# Patient Record
Sex: MALE | Race: WHITE | URBAN - METROPOLITAN AREA
[De-identification: names, ages, dates, MRNs, and addresses within clinical notes are randomized per-mention and may not be internally consistent; named-entity substitution may affect disease eponyms.]

---

## 2017-06-06 ENCOUNTER — TRANSFERRED RECORDS (OUTPATIENT)
Dept: HEALTH INFORMATION MANAGEMENT | Facility: CLINIC | Age: 67
End: 2017-06-06

## 2017-06-13 ENCOUNTER — OFFICE VISIT (OUTPATIENT)
Dept: SURGERY | Facility: CLINIC | Age: 67
End: 2017-06-13
Payer: COMMERCIAL

## 2017-06-13 VITALS
HEART RATE: 57 BPM | HEIGHT: 72 IN | BODY MASS INDEX: 28.44 KG/M2 | SYSTOLIC BLOOD PRESSURE: 132 MMHG | WEIGHT: 210 LBS | DIASTOLIC BLOOD PRESSURE: 77 MMHG

## 2017-06-13 DIAGNOSIS — E21.5 PARATHYROID ABNORMALITY (H): Primary | ICD-10-CM

## 2017-06-13 DIAGNOSIS — E21.3 HYPERPARATHYROIDISM (H): ICD-10-CM

## 2017-06-13 PROBLEM — F32.A DEPRESSION: Status: ACTIVE | Noted: 2017-06-13

## 2017-06-13 PROBLEM — R97.20 ELEVATED PROSTATE SPECIFIC ANTIGEN (PSA): Status: ACTIVE | Noted: 2017-06-13

## 2017-06-13 PROCEDURE — 99244 OFF/OP CNSLTJ NEW/EST MOD 40: CPT | Performed by: SURGERY

## 2017-06-13 NOTE — LETTER
Surgery [facility]    June 13, 2017      Estrada Chapman MD     Duncan Chappell MRN# [id number]   YOB: 1950 Age: 67 year old      PCP:  Dirk Merida [phone]     Chief Complaint:  Hyperparathyroidism     Pt was seen in consultation from Dirk Merida.     History of Present Illness:  Duncan Chappell is a 67 year old male who presented with elevated serum calcium.  This is been as high as 12.  Patient was noted to have several elevated calcium levels and a PTH was drawn.  This was extremely high, greater than 400, confirming a diagnosis of primary hyperparathyroidism.  Patient does not have a history of osteopenia, pathologic fracture, or nephrolithiasis.  No family history of parathyroid disease.  No personal history of thyroid issues.  He's here to discuss management of his hyperparathyroidism.     PMH:  Duncan Chappell  has a past medical history of Cancer (H) and Diabetes (H).  PSH:  Duncan Chappell  has no past surgical history on file.     Home medications and allergies reviewed.     Social History:  Duncan Chappell  reports that he has never smoked. He does not have any smokeless tobacco history on file.  Family History:  Duncan Chappell family history is not on file.     ROS:  The 10 point Review of Systems is negative other than noted in the HPI.  Patient does admit to some unusual fatigue.  Has had increased headaches over the last several months.  No bone pain.     Physical Exam:  Blood pressure 132/77, pulse 57, height 6' (1.829 m), weight 210 lb (95.3 kg).  210 lbs 0 oz  Healthy appearing gentleman in no distress.  Patient has a pleasant affect and communicates well.   Pupils equal round and reactive to light.   No cervical lymphadenopathy or thyromegaly.   Lung fields clear, breathing comfortably.   Heart normal sinus rhythm.  No murmurs rubs or gallops.  Abdomen soft, nontender,  nondistended.  Skin warm, dry.  No obvious rashes or lesions.     All new lab and imaging data was reviewed, including outside labs, clinic notes, and review of the outside ultrasound report.        Assessment/plan:  Healthy 67-year-old gentleman with primary hyperparathyroidism.  His diseases is of a degree to warrant surgical intervention.  Neck ultrasound suggested a possible parathyroid adenoma in the left inferior position.  I would like to get another study and have ordered a high-resolution neck CT.  If this confirms the ultrasound, I would perform a left neck exploration, possible bilateral.  This would be done with the recurrent laryngeal nerve monitor and rapid PTH assay.  I explained the risks of surgery which include bleeding, recurrent laryngeal nerve injury, and hypoparathyroidism.  We will get the surgery scheduled following his next CT.     Jostin Chapman M.D.  [facility]  [phone]

## 2017-06-13 NOTE — MR AVS SNAPSHOT
After Visit Summary   6/13/2017    Duncan Chappell    MRN: [id number]           Patient Information     Date Of Birth          1950        Visit Information        Provider Department      6/13/2017 2:00 PM Estrada Chapman MD [facility] General Surgery      Today's Diagnoses     Parathyroid abnormality (H)    -  1      Care Instructions    Your 4 D neck ct is scheduled 6/21/17 at 9:00 am at [facility], your surgery is scheduled for 7/14/17 9:00 am at [facility]          Follow-ups after your visit        Your next 10 appointments already scheduled     Jun 21, 2017  9:00 AM CDT   CT SOFT TISSUE NECK W CONTRAST with SHCT1   [facility] CT ([facility])    [address] [place] [id number] [phone]           Please bring any scans or X-rays taken at other hospitals, if similar tests were done. Also bring a list of your medicines, including vitamins, minerals and over-the-counter drugs. It is safest to leave personal items at home.  Be sure to tell your doctor:   If you have any allergies.   If there s any chance you are pregnant.   If you are breastfeeding.   If you have any special needs.  You will have contrast for this exam. To prepare:   Do not eat or drink for 2 hours before your exam. If you need to take medicine, you may take it with small sips of water. (We may ask you to take liquid medicine as well.)   The day before your exam, drink extra fluids at least six 8-ounce glasses (unless your doctor tells you to restrict your fluids).  Patients over 70 or patients with diabetes or kidney problems:   If you haven t had a blood test (creatinine test) within the last 30 days, go to your clinic or Diagnostic Imaging Department for this test.  If you have diabetes:   If your kidney function is normal, continue taking your metformin (Avandamet, Glucophage,  Glucovance, Metaglip) on the day of your exam.   If your kidney function is abnormal, wait 48 hours before restarting this medicine.  Please wear loose clothing, such as a sweat suit or jogging clothes. Avoid snaps, zippers and other metal. We may ask you to undress and put on a hospital gown.  If you have any questions, please call the Imaging Department where you will have your exam.            Jul 14, 2017   Procedure with Estrada Chapman MD   [facility] (--)    [address]  [place] [address]   [phone]            Jul 14, 2017  9:00 AM CDT   [facility] Same Day Surgery with Estrada Chapman MD   [facility] ([facility]    [facility] ([facility])   [phone]              Future tests that were ordered for you today     Open Future Orders        Priority Expected Expires Ordered    CT Soft Tissue Neck w/o & w Contrast Routine  6/13/2018 6/13/2017            Who to contact     If you have questions or need follow up information about today's clinic visit or your schedule please contact [facility] directly at [phone].  Normal or non-critical lab and imaging results will be communicated to you by PhotoSpotLandhart, letter or phone within 4 business days after the clinic has received the results. If you do not hear from us within 7 days, please contact the clinic through QuickProNotest or phone. If you have a critical or abnormal lab result, we will notify you by phone as soon as possible.  Submit refill requests through TraNet'te or call your pharmacy and they will forward the refill request to us. Please allow 3 business days for your refill to be completed.          Additional Information About Your Visit        TraNet'te Information     TraNet'te lets you send messages to your doctor, view your test results, renew your prescriptions, schedule appointments and more.  "To sign up, go to [email or web address] . Click on \"Log in\" on the left side of the screen, which will take you to the Welcome page. Then click on \"Sign up Now\" on the right side of the page.     You will be asked to enter the access code listed below, as well as some personal information. Please follow the directions to create your username and password.     Your access code is: [email or web address]  Expires: 2017  3:55 PM     Your access code will  in 90 days. If you need help or a new code, please call your [place] clinic or [phone].        Care EveryWhere ID     This is your Care EveryWhere ID. This could be used by other organizations to access your [facility] [place] medical records  [id number]        Your Vitals Were     Pulse Height BMI (Body Mass Index)             57 6' (1.829 m) 28.48 kg/m2          Blood Pressure from Last 3 Encounters:   17 132/77    Weight from Last 3 Encounters:   17 210 lb (95.3 kg)               Primary Care Provider Office Phone # Fax #    Dirk Merida [phone]       [place] [address]        Thank you!     Thank you for choosing [facility]  for your care. Our goal is always to provide you with excellent care. Hearing back from our patients is one way we can continue to improve our services. Please take a few minutes to complete the written survey that you may receive in the mail after your visit with us. Thank you!             Your Updated Medication List - Protect others around you: Learn how to safely use, store and throw away your medicines at www.disposemymeds.org.          This list is accurate as of: 17  3:55 PM.  Always use your most recent med list.                   Brand Name Dispense Instructions for use    ESCITALOPRAM OXALATE PO          METFORMIN HCL PO          simvastatin 40 MG tablet    ZOCOR    90 tablet    Take 1 tablet by mouth At Bedtime.         "

## 2017-06-13 NOTE — PATIENT INSTRUCTIONS
Your 4 D neck ct is scheduled 6/21/17 at 9:00 am at [facility], your surgery is scheduled for 7/14/17 9:00 am at [facility]

## 2017-06-14 ENCOUNTER — HOSPITAL ENCOUNTER (OUTPATIENT)
Facility: CLINIC | Age: 67
End: 2017-06-14
Payer: COMMERCIAL

## 2017-06-14 DIAGNOSIS — E21.0 PRIMARY HYPERPARATHYROIDISM (H): Primary | ICD-10-CM

## 2017-06-15 PROBLEM — E21.3 HYPERPARATHYROIDISM (H): Status: ACTIVE | Noted: 2017-06-15

## 2017-06-15 NOTE — PROGRESS NOTES
Surgery Consultation, [facility] FRANCY Chapman MD    Duncan Chappell MRN# [id number]   YOB: 1950 Age: 67 year old     PCP:  Dirk Merida [phone]    Chief Complaint:  Hyperparathyroidism    Pt was seen in consultation from Dirk Merida.    History of Present Illness:  Duncan Chappell is a 67 year old male who presented with elevated serum calcium.  This is been as high as 12.  Patient was noted to have several elevated calcium levels and a PTH was drawn.  This was extremely high, greater than 400, confirming a diagnosis of primary hyperparathyroidism.  Patient does not have a history of osteopenia, pathologic fracture, or nephrolithiasis.  No family history of parathyroid disease.  No personal history of thyroid issues.  He's here to discuss management of his hyperparathyroidism.    PMH:  Duncan Chappell  has a past medical history of Cancer (H) and Diabetes (H).  PSH:  Duncan Chappell  has no past surgical history on file.    Home medications and allergies reviewed.    Social History:  Duncan Chappell  reports that he has never smoked. He does not have any smokeless tobacco history on file.  Family History:  Duncan Chappell family history is not on file.    ROS:  The 10 point Review of Systems is negative other than noted in the HPI.  Patient does admit to some unusual fatigue.  Has had increased headaches over the last several months.  No bone pain.    Physical Exam:  Blood pressure 132/77, pulse 57, height 6' (1.829 m), weight 210 lb (95.3 kg).  210 lbs 0 oz  Healthy appearing gentleman in no distress.  Patient has a pleasant affect and communicates well.   Pupils equal round and reactive to light.   No cervical lymphadenopathy or thyromegaly.   Lung fields clear, breathing comfortably.   Heart normal sinus rhythm.  No murmurs rubs or gallops.  Abdomen soft, nontender, nondistended.  Skin warm, dry.  No  obvious rashes or lesions.    All new lab and imaging data was reviewed, including outside labs, clinic notes, and review of the outside ultrasound report.       Assessment/plan:  Healthy 67-year-old gentleman with primary hyperparathyroidism.  His diseases is of a degree to warrant surgical intervention.  Neck ultrasound suggested a possible parathyroid adenoma in the left inferior position.  I would like to get another study and have ordered a high-resolution neck CT.  If this confirms the ultrasound, I would perform a left neck exploration, possible bilateral.  This would be done with the recurrent laryngeal nerve monitor and rapid PTH assay.  I explained the risks of surgery which include bleeding, recurrent laryngeal nerve injury, and hypoparathyroidism.  We will get the surgery scheduled following his next CT.    Jostin Chapman M.D.  [facility]  [phone]    Please route or send letter to:  Primary Care Provider (PCP) and Referring Provider

## 2017-06-21 ENCOUNTER — HOSPITAL ENCOUNTER (OUTPATIENT)
Dept: CT IMAGING | Facility: CLINIC | Age: 67
Discharge: HOME | End: 2017-06-21
Attending: SURGERY | Admitting: SURGERY
Payer: COMMERCIAL

## 2017-06-21 DIAGNOSIS — E21.5 PARATHYROID ABNORMALITY (H): ICD-10-CM

## 2017-06-21 LAB
CREAT BLD-MCNC: 1 MG/DL (ref 0.66–1.25)
GFR SERPL CREATININE-BSD FRML MDRD: 75 ML/MIN/1.7M2

## 2017-06-21 PROCEDURE — 82565 ASSAY OF CREATININE: CPT

## 2017-06-21 PROCEDURE — 25000125 ZZHC RX 250: Performed by: SURGERY

## 2017-06-21 PROCEDURE — 70492 CT SFT TSUE NCK W/O & W/DYE: CPT

## 2017-06-21 PROCEDURE — 25000128 H RX IP 250 OP 636: Performed by: SURGERY

## 2017-06-21 RX ORDER — IOPAMIDOL 755 MG/ML
75 INJECTION, SOLUTION INTRAVASCULAR ONCE
Status: COMPLETED | OUTPATIENT
Start: 2017-06-21 | End: 2017-06-21

## 2017-06-21 RX ADMIN — SODIUM CHLORIDE 80 ML: 9 INJECTION, SOLUTION INTRAVENOUS at 09:40

## 2017-06-21 RX ADMIN — IOPAMIDOL 75 ML: 755 INJECTION, SOLUTION INTRAVENOUS at 09:40

## 2017-07-14 ENCOUNTER — HOSPITAL ENCOUNTER (OUTPATIENT)
Facility: CLINIC | Age: 67
Discharge: HOME | End: 2017-07-14
Attending: SURGERY | Admitting: SURGERY
Payer: COMMERCIAL

## 2017-07-14 ENCOUNTER — OFFICE VISIT (OUTPATIENT)
Dept: SURGERY | Facility: PHYSICIAN GROUP | Age: 67
End: 2017-07-14
Payer: COMMERCIAL

## 2017-07-14 ENCOUNTER — ANESTHESIA EVENT (OUTPATIENT)
Dept: SURGERY | Facility: CLINIC | Age: 67
End: 2017-07-14
Payer: COMMERCIAL

## 2017-07-14 ENCOUNTER — SURGERY (OUTPATIENT)
Age: 67
End: 2017-07-14

## 2017-07-14 ENCOUNTER — HOSPITAL ENCOUNTER (OUTPATIENT)
Dept: NUCLEAR MEDICINE | Facility: CLINIC | Age: 67
Setting detail: NUCLEAR MEDICINE
End: 2017-07-14
Attending: SURGERY
Payer: COMMERCIAL

## 2017-07-14 ENCOUNTER — ANESTHESIA (OUTPATIENT)
Dept: SURGERY | Facility: CLINIC | Age: 67
End: 2017-07-14
Payer: COMMERCIAL

## 2017-07-14 VITALS
RESPIRATION RATE: 12 BRPM | HEIGHT: 72 IN | SYSTOLIC BLOOD PRESSURE: 135 MMHG | WEIGHT: 210 LBS | OXYGEN SATURATION: 95 % | DIASTOLIC BLOOD PRESSURE: 8 MMHG | TEMPERATURE: 97.5 F | BODY MASS INDEX: 28.44 KG/M2

## 2017-07-14 DIAGNOSIS — E21.0 PRIMARY HYPERPARATHYROIDISM (H): ICD-10-CM

## 2017-07-14 DIAGNOSIS — E21.3 HYPERPARATHYROIDISM (H): Primary | ICD-10-CM

## 2017-07-14 LAB
GLUCOSE BLDC GLUCOMTR-MCNC: 159 MG/DL (ref 70–99)
GLUCOSE BLDC GLUCOMTR-MCNC: 177 MG/DL (ref 70–99)
PTH-INTACT SERPL-MCNC: 43 PG/ML (ref 12–72)
PTH-INTACT SERPL-MCNC: 454 PG/ML (ref 12–72)
PTH-INTACT SERPL-MCNC: 58 PG/ML (ref 12–72)

## 2017-07-14 PROCEDURE — 36415 COLL VENOUS BLD VENIPUNCTURE: CPT | Performed by: SURGERY

## 2017-07-14 PROCEDURE — 82962 GLUCOSE BLOOD TEST: CPT

## 2017-07-14 PROCEDURE — 88305 TISSUE EXAM BY PATHOLOGIST: CPT | Performed by: SURGERY

## 2017-07-14 PROCEDURE — 37000008 ZZH ANESTHESIA TECHNICAL FEE, 1ST 30 MIN: Performed by: SURGERY

## 2017-07-14 PROCEDURE — 71000027 ZZH RECOVERY PHASE 2 EACH 15 MINS: Performed by: SURGERY

## 2017-07-14 PROCEDURE — 27210995 ZZH RX 272: Performed by: SURGERY

## 2017-07-14 PROCEDURE — 25000566 ZZH SEVOFLURANE, EA 15 MIN: Performed by: SURGERY

## 2017-07-14 PROCEDURE — 25000128 H RX IP 250 OP 636: Performed by: NURSE ANESTHETIST, CERTIFIED REGISTERED

## 2017-07-14 PROCEDURE — 37000009 ZZH ANESTHESIA TECHNICAL FEE, EACH ADDTL 15 MIN: Performed by: SURGERY

## 2017-07-14 PROCEDURE — 36000063 ZZH SURGERY LEVEL 4 EA 15 ADDTL MIN: Performed by: SURGERY

## 2017-07-14 PROCEDURE — 36000093 ZZH SURGERY LEVEL 4 1ST 30 MIN: Performed by: SURGERY

## 2017-07-14 PROCEDURE — 60500 EXPLORE PARATHYROID GLANDS: CPT | Mod: AS | Performed by: PHYSICIAN ASSISTANT

## 2017-07-14 PROCEDURE — 88331 PATH CONSLTJ SURG 1 BLK 1SPC: CPT | Performed by: SURGERY

## 2017-07-14 PROCEDURE — 40000170 ZZH STATISTIC PRE-PROCEDURE ASSESSMENT II: Performed by: SURGERY

## 2017-07-14 PROCEDURE — 25000125 ZZHC RX 250: Performed by: SURGERY

## 2017-07-14 PROCEDURE — 27210794 ZZH OR GENERAL SUPPLY STERILE: Performed by: SURGERY

## 2017-07-14 PROCEDURE — 71000012 ZZH RECOVERY PHASE 1 LEVEL 1 FIRST HR: Performed by: SURGERY

## 2017-07-14 PROCEDURE — 25000132 ZZH RX MED GY IP 250 OP 250 PS 637: Performed by: PHYSICIAN ASSISTANT

## 2017-07-14 PROCEDURE — 25000128 H RX IP 250 OP 636: Performed by: SURGERY

## 2017-07-14 PROCEDURE — 25000125 ZZHC RX 250: Performed by: NURSE ANESTHETIST, CERTIFIED REGISTERED

## 2017-07-14 PROCEDURE — 60500 EXPLORE PARATHYROID GLANDS: CPT | Performed by: SURGERY

## 2017-07-14 PROCEDURE — 88331 PATH CONSLTJ SURG 1 BLK 1SPC: CPT | Mod: 26 | Performed by: SURGERY

## 2017-07-14 PROCEDURE — S0020 INJECTION, BUPIVICAINE HYDRO: HCPCS | Performed by: SURGERY

## 2017-07-14 PROCEDURE — 83970 ASSAY OF PARATHORMONE: CPT | Performed by: SURGERY

## 2017-07-14 PROCEDURE — 88305 TISSUE EXAM BY PATHOLOGIST: CPT | Mod: 26 | Performed by: SURGERY

## 2017-07-14 RX ORDER — MAGNESIUM HYDROXIDE 1200 MG/15ML
LIQUID ORAL PRN
Status: DISCONTINUED | OUTPATIENT
Start: 2017-07-14 | End: 2017-07-14 | Stop reason: HOSPADM

## 2017-07-14 RX ORDER — PROPOFOL 10 MG/ML
INJECTION, EMULSION INTRAVENOUS CONTINUOUS PRN
Status: DISCONTINUED | OUTPATIENT
Start: 2017-07-14 | End: 2017-07-14

## 2017-07-14 RX ORDER — CEFAZOLIN SODIUM 1 G/3ML
1 INJECTION, POWDER, FOR SOLUTION INTRAMUSCULAR; INTRAVENOUS SEE ADMIN INSTRUCTIONS
Status: DISCONTINUED | OUTPATIENT
Start: 2017-07-14 | End: 2017-07-14 | Stop reason: HOSPADM

## 2017-07-14 RX ORDER — DEXAMETHASONE SODIUM PHOSPHATE 4 MG/ML
INJECTION, SOLUTION INTRA-ARTICULAR; INTRALESIONAL; INTRAMUSCULAR; INTRAVENOUS; SOFT TISSUE PRN
Status: DISCONTINUED | OUTPATIENT
Start: 2017-07-14 | End: 2017-07-14

## 2017-07-14 RX ORDER — BUPIVACAINE HYDROCHLORIDE 2.5 MG/ML
INJECTION, SOLUTION INFILTRATION; PERINEURAL PRN
Status: DISCONTINUED | OUTPATIENT
Start: 2017-07-14 | End: 2017-07-14 | Stop reason: HOSPADM

## 2017-07-14 RX ORDER — HYDROCODONE BITARTRATE AND ACETAMINOPHEN 5; 325 MG/1; MG/1
1-2 TABLET ORAL
Status: COMPLETED | OUTPATIENT
Start: 2017-07-14 | End: 2017-07-14

## 2017-07-14 RX ORDER — EPHEDRINE SULFATE 50 MG/ML
INJECTION, SOLUTION INTRAMUSCULAR; INTRAVENOUS; SUBCUTANEOUS PRN
Status: DISCONTINUED | OUTPATIENT
Start: 2017-07-14 | End: 2017-07-14

## 2017-07-14 RX ORDER — FENTANYL CITRATE 50 UG/ML
25-50 INJECTION, SOLUTION INTRAMUSCULAR; INTRAVENOUS
Status: DISCONTINUED | OUTPATIENT
Start: 2017-07-14 | End: 2017-07-14 | Stop reason: HOSPADM

## 2017-07-14 RX ORDER — GLYCOPYRROLATE 0.2 MG/ML
INJECTION, SOLUTION INTRAMUSCULAR; INTRAVENOUS PRN
Status: DISCONTINUED | OUTPATIENT
Start: 2017-07-14 | End: 2017-07-14

## 2017-07-14 RX ORDER — LIDOCAINE HYDROCHLORIDE 20 MG/ML
INJECTION, SOLUTION INFILTRATION; PERINEURAL PRN
Status: DISCONTINUED | OUTPATIENT
Start: 2017-07-14 | End: 2017-07-14

## 2017-07-14 RX ORDER — FENTANYL CITRATE 50 UG/ML
INJECTION, SOLUTION INTRAMUSCULAR; INTRAVENOUS PRN
Status: DISCONTINUED | OUTPATIENT
Start: 2017-07-14 | End: 2017-07-14

## 2017-07-14 RX ORDER — ONDANSETRON 2 MG/ML
4 INJECTION INTRAMUSCULAR; INTRAVENOUS EVERY 30 MIN PRN
Status: DISCONTINUED | OUTPATIENT
Start: 2017-07-14 | End: 2017-07-14 | Stop reason: HOSPADM

## 2017-07-14 RX ORDER — BUPIVACAINE HYDROCHLORIDE 2.5 MG/ML
INJECTION, SOLUTION EPIDURAL; INFILTRATION; INTRACAUDAL
Status: DISCONTINUED
Start: 2017-07-14 | End: 2017-07-14 | Stop reason: HOSPADM

## 2017-07-14 RX ORDER — HYDROCODONE BITARTRATE AND ACETAMINOPHEN 5; 325 MG/1; MG/1
1-2 TABLET ORAL EVERY 4 HOURS PRN
Qty: 20 TABLET | Refills: 0 | Status: SHIPPED | OUTPATIENT
Start: 2017-07-14 | End: 2017-07-17

## 2017-07-14 RX ORDER — ONDANSETRON 2 MG/ML
INJECTION INTRAMUSCULAR; INTRAVENOUS PRN
Status: DISCONTINUED | OUTPATIENT
Start: 2017-07-14 | End: 2017-07-14

## 2017-07-14 RX ORDER — PHYSOSTIGMINE SALICYLATE 1 MG/ML
1.2 INJECTION INTRAVENOUS
Status: DISCONTINUED | OUTPATIENT
Start: 2017-07-14 | End: 2017-07-14 | Stop reason: HOSPADM

## 2017-07-14 RX ORDER — PROPOFOL 10 MG/ML
INJECTION, EMULSION INTRAVENOUS PRN
Status: DISCONTINUED | OUTPATIENT
Start: 2017-07-14 | End: 2017-07-14

## 2017-07-14 RX ORDER — CEFAZOLIN SODIUM 2 G/100ML
2 INJECTION, SOLUTION INTRAVENOUS
Status: COMPLETED | OUTPATIENT
Start: 2017-07-14 | End: 2017-07-14

## 2017-07-14 RX ORDER — NALOXONE HYDROCHLORIDE 0.4 MG/ML
.1-.4 INJECTION, SOLUTION INTRAMUSCULAR; INTRAVENOUS; SUBCUTANEOUS
Status: DISCONTINUED | OUTPATIENT
Start: 2017-07-14 | End: 2017-07-14 | Stop reason: HOSPADM

## 2017-07-14 RX ORDER — FENTANYL CITRATE 50 UG/ML
25-50 INJECTION, SOLUTION INTRAMUSCULAR; INTRAVENOUS EVERY 5 MIN PRN
Status: DISCONTINUED | OUTPATIENT
Start: 2017-07-14 | End: 2017-07-14 | Stop reason: HOSPADM

## 2017-07-14 RX ORDER — ONDANSETRON 4 MG/1
4 TABLET, ORALLY DISINTEGRATING ORAL EVERY 30 MIN PRN
Status: DISCONTINUED | OUTPATIENT
Start: 2017-07-14 | End: 2017-07-14 | Stop reason: HOSPADM

## 2017-07-14 RX ORDER — MEPERIDINE HYDROCHLORIDE 25 MG/ML
12.5 INJECTION INTRAMUSCULAR; INTRAVENOUS; SUBCUTANEOUS
Status: DISCONTINUED | OUTPATIENT
Start: 2017-07-14 | End: 2017-07-14 | Stop reason: HOSPADM

## 2017-07-14 RX ORDER — SODIUM CHLORIDE, SODIUM LACTATE, POTASSIUM CHLORIDE, CALCIUM CHLORIDE 600; 310; 30; 20 MG/100ML; MG/100ML; MG/100ML; MG/100ML
INJECTION, SOLUTION INTRAVENOUS CONTINUOUS PRN
Status: DISCONTINUED | OUTPATIENT
Start: 2017-07-14 | End: 2017-07-14

## 2017-07-14 RX ORDER — SODIUM CHLORIDE, SODIUM LACTATE, POTASSIUM CHLORIDE, CALCIUM CHLORIDE 600; 310; 30; 20 MG/100ML; MG/100ML; MG/100ML; MG/100ML
INJECTION, SOLUTION INTRAVENOUS CONTINUOUS
Status: DISCONTINUED | OUTPATIENT
Start: 2017-07-14 | End: 2017-07-14 | Stop reason: HOSPADM

## 2017-07-14 RX ORDER — NEOSTIGMINE METHYLSULFATE 1 MG/ML
VIAL (ML) INJECTION PRN
Status: DISCONTINUED | OUTPATIENT
Start: 2017-07-14 | End: 2017-07-14

## 2017-07-14 RX ADMIN — FENTANYL CITRATE 50 MCG: 50 INJECTION, SOLUTION INTRAMUSCULAR; INTRAVENOUS at 09:52

## 2017-07-14 RX ADMIN — SODIUM CHLORIDE, POTASSIUM CHLORIDE, SODIUM LACTATE AND CALCIUM CHLORIDE: 600; 310; 30; 20 INJECTION, SOLUTION INTRAVENOUS at 09:46

## 2017-07-14 RX ADMIN — BUPIVACAINE HYDROCHLORIDE 10 ML: 2.5 INJECTION, SOLUTION EPIDURAL; INFILTRATION; INTRACAUDAL; PERINEURAL at 11:48

## 2017-07-14 RX ADMIN — Medication 5 MG: at 11:10

## 2017-07-14 RX ADMIN — HYDROCODONE BITARTRATE AND ACETAMINOPHEN 1 TABLET: 5; 325 TABLET ORAL at 12:38

## 2017-07-14 RX ADMIN — LIDOCAINE HYDROCHLORIDE 60 MG: 20 INJECTION, SOLUTION INFILTRATION; PERINEURAL at 09:52

## 2017-07-14 RX ADMIN — ONDANSETRON 4 MG: 2 INJECTION INTRAMUSCULAR; INTRAVENOUS at 11:34

## 2017-07-14 RX ADMIN — Medication 5 MG: at 10:55

## 2017-07-14 RX ADMIN — PROPOFOL 200 MCG/KG/MIN: 10 INJECTION, EMULSION INTRAVENOUS at 09:52

## 2017-07-14 RX ADMIN — Medication 27.8 MILLICURIE: at 08:35

## 2017-07-14 RX ADMIN — ROCURONIUM BROMIDE 60 MG: 10 INJECTION INTRAVENOUS at 09:52

## 2017-07-14 RX ADMIN — SODIUM CHLORIDE 1000 ML: 0.9 IRRIGANT IRRIGATION at 11:48

## 2017-07-14 RX ADMIN — Medication 5 MG: at 10:40

## 2017-07-14 RX ADMIN — SODIUM CHLORIDE, POTASSIUM CHLORIDE, SODIUM LACTATE AND CALCIUM CHLORIDE: 600; 310; 30; 20 INJECTION, SOLUTION INTRAVENOUS at 10:57

## 2017-07-14 RX ADMIN — GLYCOPYRROLATE 0.7 MG: 0.2 INJECTION, SOLUTION INTRAMUSCULAR; INTRAVENOUS at 11:55

## 2017-07-14 RX ADMIN — GLYCOPYRROLATE 0.1 MG: 0.2 INJECTION, SOLUTION INTRAMUSCULAR; INTRAVENOUS at 10:05

## 2017-07-14 RX ADMIN — Medication 10 MG: at 10:07

## 2017-07-14 RX ADMIN — NEOSTIGMINE METHYLSULFATE 4.5 MG: 1 INJECTION INTRAMUSCULAR; INTRAVENOUS; SUBCUTANEOUS at 11:55

## 2017-07-14 RX ADMIN — MIDAZOLAM HYDROCHLORIDE 2 MG: 1 INJECTION, SOLUTION INTRAMUSCULAR; INTRAVENOUS at 09:46

## 2017-07-14 RX ADMIN — DEXAMETHASONE SODIUM PHOSPHATE 4 MG: 4 INJECTION, SOLUTION INTRA-ARTICULAR; INTRALESIONAL; INTRAMUSCULAR; INTRAVENOUS; SOFT TISSUE at 10:03

## 2017-07-14 RX ADMIN — PROPOFOL 200 MG: 10 INJECTION, EMULSION INTRAVENOUS at 09:52

## 2017-07-14 RX ADMIN — GLYCOPYRROLATE 0.1 MG: 0.2 INJECTION, SOLUTION INTRAMUSCULAR; INTRAVENOUS at 10:35

## 2017-07-14 RX ADMIN — CEFAZOLIN SODIUM 2 G: 2 INJECTION, SOLUTION INTRAVENOUS at 10:00

## 2017-07-14 RX ADMIN — FENTANYL CITRATE 50 MCG: 50 INJECTION, SOLUTION INTRAMUSCULAR; INTRAVENOUS at 10:28

## 2017-07-14 RX ADMIN — Medication 5 MG: at 10:42

## 2017-07-14 NOTE — DISCHARGE INSTRUCTIONS
HOME CARE FOLLOWING PARATHYROID SURGERY      INCISIONAL CARE:    You may expect a small amount of drainage from your previous drain site.  Cover with bandage as needed.    After removing the dressing, you may shower.  Do not submerse the incision for 1 week.    Sutures will absorb and do not need to be removed.    Leave the steri-strip (white paper tape) in place until it falls off, or remove at 1 week after surgery.    A lump/ridge under the incision is normal and will gradually resolve.    ACTIVITY:  Light Activity -- you may immediately be up and about as tolerated.  Driving -- you may drive when comfortable and off narcotic pain medications.  Light Work -- resume when comfortable off pain medications.  (If you can drive, you probably can work.)  Strenuous Work/Activity -- limit lifting to 20 pounds for 1 week.  Progressively increase with time.  Active Sports (running, biking, etc.) -- resume when comfortable.    DIET:  No restrictions.  Increased fluid intake is recommended. While taking pain medications, increase dietary fiber or add a fiber supplementation like Metamucil or Citrucel to help prevent constipation - a possible side effect of pain medications.    DISCOMFORT:  Use pain medications as prescribed by your surgeon.  Take the pain medication with some food, when possible, to minimize side effects.  Intermittent use of ice packs may help during the first 48 hours.  Expect gradual improvement.    CALCIUM SUPPLEMENTATION:  Most patients are prescribed to take a calcium supplement after surgery.  Please take as prescribed.  Watch for symptoms of numbness or tingling around the mouth or in the fingers or toes.  This may be a sign of a low calcium level.  If this happens, take an extra dose of your calcium supplement.  If the symptoms persist, please contact the office.  You may need to have your blood calcium level checked by doing a simple blood test.  We may also need to make further adjustments in your  dose of calcium supplementation.  At times, an office visit is required.     RETURN APPOINTMENT:  Schedule a follow-up visit 2 weeks post-op (you may do this any time after surgery is scheduled).  Office Phone:  [phone]     CONTACT US IF THE FOLLOWING DEVELOPS:   1. A fever that is above 101     2. If there is a large amount of drainage, bleeding, or swelling.   3. Severe pain that is not relieved by your prescription.   4. Drainage that is thick, cloudy, yellow, green or white.               Same Day Surgery Discharge Instructions for  Sedation and General Anesthesia       It's not unusual to feel dizzy, light-headed or faint for up to 24 hours after surgery or while taking pain medication.  If you have these symptoms: sit for a few minutes before standing and have someone assist you when you get up to walk or use the bathroom.      You should rest and relax for the next 24 hours. We recommend you make arrangements to have an adult stay with you for at least 24 hours after your discharge.  Avoid hazardous and strenuous activity.      DO NOT DRIVE any vehicle or operate mechanical equipment for 24 hours following the end of your surgery.  Even though you may feel normal, your reactions may be affected by the medication you have received.      Do not drink alcoholic beverages for 24 hours following surgery.       Slowly progress to your regular diet as you feel able. It's not unusual to feel nauseated and/or vomit after receiving anesthesia.  If you develop these symptoms, drink clear liquids (apple juice, ginger ale, broth, 7-up, etc. ) until you feel better.  If your nausea and vomiting persists for 24 hours, please notify your surgeon.        All narcotic pain medications, along with inactivity and anesthesia, can cause constipation. Drinking plenty of liquids and increasing fiber intake will help.      For any questions of a medical nature, call your surgeon.      Do not make important decisions for 24  hours.      If you had general anesthesia, you may have a sore throat for a couple of days related to the breathing tube used during surgery.  You may use Cepacol lozenges to help with this discomfort.  If it worsens or if you develop a fever, contact your surgeon.       If you feel your pain is not well managed with the pain medications prescribed by your surgeon, please contact your surgeon's office to let them know so they can address your concerns.        **If you have concerns or questions about your procedure,    please contact Dr Chapman at  [phone]**

## 2017-07-14 NOTE — ANESTHESIA POSTPROCEDURE EVALUATION
Patient: Duncan Chappell    Procedure(s):  RIGHT NECK EXPLORATION;RIGHT EXCISION PARATHYROID ADENOMA WITH FROZEN SECTION - Wound Class: I-Clean   - Wound Class: I-Clean    Diagnosis:PRIMARY HYPER PARATHYROIDISM  Diagnosis Additional Information: No value filed.    Anesthesia Type:  General    Note:  Anesthesia Post Evaluation    Patient location during evaluation: PACU  Patient participation: Able to fully participate in evaluation  Level of consciousness: awake  Pain management: adequate  Airway patency: patent  Cardiovascular status: acceptable  Respiratory status: acceptable  Hydration status: acceptable  PONV: controlled     Anesthetic complications: None          Last vitals:  Vitals:    07/14/17 1205 07/14/17 1215 07/14/17 1230   BP: 163/85 143/77 (!) 117/95   Resp: 12 11 16   Temp: 36.5  C (97.7  F) 36.3  C (97.3  F) 36.4  C (97.5  F)   SpO2: 99% 100% 94%         Electronically Signed By: Baldo Hurd MD  July 14, 2017  12:37 PM

## 2017-07-14 NOTE — BRIEF OP NOTE
[facility] Brief Operative Note    Pre-operative diagnosis: PRIMARY HYPER PARATHYROIDISM   Post-operative diagnosis Hyperparathyroidism      Procedure: Procedure(s):  RIGHT NECK EXPLORATION;RIGHT EXCISION PARATHYROID ADENOMA WITH FROZEN SECTION - Wound Class: I-Clean   - Wound Class: I-Clean   Surgeon(s): Surgeon(s) and Role:     * Estrada Chapman MD - Primary     * Kalpana Diamond PA-C - Assisting   Estimated blood loss: 5 mL    Specimens:   ID Type Source Tests Collected by Time Destination   1 : Blood Blood, venous Blood PARATHYROID HORMONE INTACT INTRAOPERATIVE Estrada Chapman MD 7/14/2017 11:13 AM    2 : Blood Blood, venous Blood PARATHYROID HORMONE INTACT INTRAOPERATIVE Estrada Chapman MD 7/14/2017 11:21 AM    A : Right superior parathyroid gland Tissue Parathyroid SURGICAL PATHOLOGY EXAM Estrada Chapman MD 7/14/2017 10:58 AM       Findings: Same as above.       Kalpana Diamond PA-C

## 2017-07-14 NOTE — OP NOTE
General Surgery Operative Note    PREOPERATIVE DIAGNOSIS:  Primary hyperparathyroidism.    POSTOPERATIVE DIAGNOSIS:  Primary hyperparathyroidism.    PROCEDURE:   Excision of right superior parathyroid adenoma, Unilateral neck exploration.    With recurrent laryngeal nerve monitor.    ANESTHESIA:  General.    PREOPERATIVE MEDICATIONS:  Ancef IV.    SURGEON:  Estrada Chapman MD, MD    ASSISTANT:  Kalpana Diamond PA-C.  First assistant was necessary due to challenging exposure and the need for improved visualization and help maintaining hemostasis.    ESTIMATED BLOOD LOSS:  10 cc's    INDICATIONS:  Duncan Chappell is a 67 year old male who has primary hyperparathyroidism. He has had symptoms of hypercalcemia.  He has been found to have an elevated calcium of 12.  He has a localizing sestamibi in the right middle neck.  He presents today for neck exploration, excision of parathyroid adenoma.  His PTH preoperatively is 450.    DESCRIPTION OF PROCEDURE:  The patient was placed supine, head and neck in extension and a bump between the scapulae.  Transverse cervical neck creases had been marked in the preinduction area and the one most suitable was utilized for exposure.  The gamma probe was utilized to find the area of increased counts which correlated with the preoperative localizing sestimibi.  Incision was made, superior and inferior skin flaps raised.  Midline fascia opened and reflected to the right.  An abnormal parathyroid was identified at the right superior location.  It was meticulously dissected from the surrounding tissue and submitted for frozen section which confirmed a .85 gram hypercellular parathyroid.  We then explored the right inferior neck.  During the exploration, I encountered what I believe to be the second parathyroid. It was not biopsied but appeared to represent normal parathyroid tissue. The recurrent laryngeal nerve was also identified and conduction was normal with nerve  monitor.  The site was irrigated and inspected for hemostasis.  The PTH assays were sent at 15 and 25 minutes post-excision and the first value came back at 55, signifying the completeness of the dissection.  The patient's incision site was then closed with running 3-0 Vicryl for the midline fascia, interrupted for platysma and 4-0 subcuticular Monocryl for skin.  Marcaine 0.25% plain was instilled and the patient transferred to recovery in good condition.    INTRAOPERATIVE FINDINGS:  1.  A 0.85 gram hypercellular right inferior parathyroid correlated nicely with sestamibi scan.  2.  Second right-sided parathyroid appeared to be grossly normal.  3.  PTH assay diminished from 450 to 55 at 15 minutes post excision.  4.  Grossly normal thyroid.    Specimens:   ID Type Source Tests Collected by Time Destination   1 : Blood Blood, venous Blood PARATHYROID HORMONE INTACT INTRAOPERATIVE Estrada Chapman MD 7/14/2017 11:13 AM    2 : Blood Blood, venous Blood PARATHYROID HORMONE INTACT INTRAOPERATIVE Estrada Chapman MD 7/14/2017 11:21 AM    A : Right superior parathyroid gland Tissue Parathyroid SURGICAL PATHOLOGY EXAM Estrada Chapman MD 7/14/2017 10:58 AM        Estrada Chapman MD, MD

## 2017-07-14 NOTE — OR NURSING
Per Dr. Chapman, pt does not need to take calcium supplementation.  Pt and wife notified at discharge.

## 2017-07-14 NOTE — ANESTHESIA CARE TRANSFER NOTE
Patient: Duncan Chappell    Procedure(s):  RIGHT NECK EXPLORATION;RIGHT EXCISION PARATHYROID ADENOMA WITH FROZEN SECTION - Wound Class: I-Clean   - Wound Class: I-Clean    Diagnosis: PRIMARY HYPER PARATHYROIDISM  Diagnosis Additional Information: No value filed.    Anesthesia Type:   General     Note:  Airway :Face Mask  Patient transferred to:PACU  Comments: Pt awake and able to verbalize needs. ALL monitors on and audible. Vital signs stable. Spontaneous respiration without difficulty. o2 sats 100% on 10Lo2 per face mask. Pt denies pain. Report given to PACU RN.      Vitals: (Last set prior to Anesthesia Care Transfer)    CRNA VITALS  7/14/2017 1133 - 7/14/2017 1208      7/14/2017             NIBP: 163/85    Pulse: 65    SpO2: 100 %                Electronically Signed By: MICHELLE Esquivel CRNA  July 14, 2017  12:08 PM

## 2017-07-14 NOTE — IP AVS SNAPSHOT
MRN:[id number]                      After Visit Summary   7/14/2017    Duncan Chappell    MRN: [id number]           Thank you!     Thank you for choosing [facility] for your care. Our goal is always to provide you with excellent care. Hearing back from our patients is one way we can continue to improve our services. Please take a few minutes to complete the written survey that you may receive in the mail after you visit with us. Thank you!        Patient Information     Date Of Birth          1950        About your hospital stay     You were admitted on:  July 14, 2017 You last received care in the:  [facility]    You were discharged on:  July 14, 2017       Who to Call     For medical emergencies, please call 911.  For non-urgent questions about your medical care, please call your primary care provider or clinic, [phone]  For questions related to your surgery, please call your surgery clinic        Attending Provider     Provider Estrada Mak MD Surgery       Primary Care Provider Office Phone # Fax #    Dirk Merida [phone]      After Care Instructions     Discharge Instructions       Patient to follow up with Dr Chapman in 2 weeks, call office for appointment at [phone]                  Further instructions from your care team         HOME CARE FOLLOWING PARATHYROID SURGERY      INCISIONAL CARE:    You may expect a small amount of drainage from your previous drain site.  Cover with bandage as needed.    After removing the dressing, you may shower.  Do not submerse the incision for 1 week.    Sutures will absorb and do not need to be removed.    Leave the steri-strip (white paper tape) in place until it falls off, or remove at 1 week after surgery.    A lump/ridge under the incision is normal and will gradually resolve.    ACTIVITY:  Light Activity -- you may immediately be up and about as  tolerated.  Driving -- you may drive when comfortable and off narcotic pain medications.  Light Work -- resume when comfortable off pain medications.  (If you can drive, you probably can work.)  Strenuous Work/Activity -- limit lifting to 20 pounds for 1 week.  Progressively increase with time.  Active Sports (running, biking, etc.) -- resume when comfortable.    DIET:  No restrictions.  Increased fluid intake is recommended. While taking pain medications, increase dietary fiber or add a fiber supplementation like Metamucil or Citrucel to help prevent constipation - a possible side effect of pain medications.    DISCOMFORT:  Use pain medications as prescribed by your surgeon.  Take the pain medication with some food, when possible, to minimize side effects.  Intermittent use of ice packs may help during the first 48 hours.  Expect gradual improvement.    CALCIUM SUPPLEMENTATION:  Most patients are prescribed to take a calcium supplement after surgery.  Please take as prescribed.  Watch for symptoms of numbness or tingling around the mouth or in the fingers or toes.  This may be a sign of a low calcium level.  If this happens, take an extra dose of your calcium supplement.  If the symptoms persist, please contact the office.  You may need to have your blood calcium level checked by doing a simple blood test.  We may also need to make further adjustments in your dose of calcium supplementation.  At times, an office visit is required.     RETURN APPOINTMENT:  Schedule a follow-up visit 2 weeks post-op (you may do this any time after surgery is scheduled).  Office Phone:  [phone]     CONTACT US IF THE FOLLOWING DEVELOPS:   1. A fever that is above 101     2. If there is a large amount of drainage, bleeding, or swelling.   3. Severe pain that is not relieved by your prescription.   4. Drainage that is thick, cloudy, yellow, green or white.               Same Day Surgery Discharge Instructions for  Sedation and  General Anesthesia       It's not unusual to feel dizzy, light-headed or faint for up to 24 hours after surgery or while taking pain medication.  If you have these symptoms: sit for a few minutes before standing and have someone assist you when you get up to walk or use the bathroom.      You should rest and relax for the next 24 hours. We recommend you make arrangements to have an adult stay with you for at least 24 hours after your discharge.  Avoid hazardous and strenuous activity.      DO NOT DRIVE any vehicle or operate mechanical equipment for 24 hours following the end of your surgery.  Even though you may feel normal, your reactions may be affected by the medication you have received.      Do not drink alcoholic beverages for 24 hours following surgery.       Slowly progress to your regular diet as you feel able. It's not unusual to feel nauseated and/or vomit after receiving anesthesia.  If you develop these symptoms, drink clear liquids (apple juice, ginger ale, broth, 7-up, etc. ) until you feel better.  If your nausea and vomiting persists for 24 hours, please notify your surgeon.        All narcotic pain medications, along with inactivity and anesthesia, can cause constipation. Drinking plenty of liquids and increasing fiber intake will help.      For any questions of a medical nature, call your surgeon.      Do not make important decisions for 24 hours.      If you had general anesthesia, you may have a sore throat for a couple of days related to the breathing tube used during surgery.  You may use Cepacol lozenges to help with this discomfort.  If it worsens or if you develop a fever, contact your surgeon.       If you feel your pain is not well managed with the pain medications prescribed by your surgeon, please contact your surgeon's office to let them know so they can address your concerns.        **If you have concerns or questions about your procedure,    please contact Dr Chapman at   "[phone]**        Pending Results     Date and Time Order Name Status Description    2017 1059 Surgical pathology exam In process     2017 0741 NM Parathyroid Surgical Injection In process             Admission Information     Date & Time Provider Department Dept. Phone    2017 Estrada Chapman MD [facility] Same Day Surgery [phone]      Your Vitals Were     Blood Pressure Temperature Respirations Height Weight Pulse Oximetry    117/95 97.5  F (36.4  C) 16 1.829 m (6') 95.3 kg (210 lb) 94%    BMI (Body Mass Index)                   28.48 kg/m2           MyChart Information     [employer] lets you send messages to your doctor, view your test results, renew your prescriptions, schedule appointments and more. To sign up, go to [email or web address] . Click on \"Log in\" on the left side of the screen, which will take you to the Welcome page. Then click on \"Sign up Now\" on the right side of the page.     You will be asked to enter the access code listed below, as well as some personal information. Please follow the directions to create your username and password.     Your access code is: [email or web address]  Expires: 2017  3:55 PM     Your access code will  in 90 days. If you need help or a new code, please call your [facility] clinic or [phone].        Care EveryWhere ID     This is your Care EveryWhere ID. This could be used by other organizations to access your [facility] medical records  [id number]        Equal Access to Services     [facility] THOMAS : Hadii selma bergman hadasho Somichaelali, waaxda luqadaha, qaybta kaalmada adeegyada, dawna idiin hayaan adeirma anne . So [facility] [phone].    ATENCIÓN: Si habla español, tiene a levy disposición servicios gratuitos de asistencia lingüística. Llame al [phone].    We comply with applicable federal civil rights laws and [place] laws. We do not discriminate on the basis of race, color, national origin, age, disability sex, " sexual orientation or gender identity.               Review of your medicines      START taking        Dose / Directions    HYDROcodone-acetaminophen 5-325 MG per tablet   Commonly known as:  NORCO   Used for:  Hyperparathyroidism (H)   Notes to Patient:  You were given one tablet at 12:40 p.m.        Dose:  1-2 tablet   Take 1-2 tablets by mouth every 4 hours as needed for other (Moderate to Severe Pain)   Quantity:  20 tablet   Refills:  0         CONTINUE these medicines which have NOT CHANGED        Dose / Directions    ESCITALOPRAM OXALATE PO        Refills:  0       METFORMIN HCL PO        Dose:  1000 mg   Take 1,000 mg by mouth 2 times daily (with meals)   Refills:  0       simvastatin 40 MG tablet   Commonly known as:  ZOCOR   Used for:  Hypercholesterolemia        Dose:  40 mg   Take 1 tablet by mouth At Bedtime.   Quantity:  90 tablet   Refills:  3            Where to get your medicines      Some of these will need a paper prescription and others can be bought over the counter. Ask your nurse if you have questions.     Bring a paper prescription for each of these medications     HYDROcodone-acetaminophen 5-325 MG per tablet                Protect others around you: Learn how to safely use, store and throw away your medicines at www.disposemymeds.org.             Medication List: This is a list of all your medications and when to take them. Check marks below indicate your daily home schedule. Keep this list as a reference.      Medications           Morning Afternoon Evening Bedtime As Needed    ESCITALOPRAM OXALATE PO                                HYDROcodone-acetaminophen 5-325 MG per tablet   Commonly known as:  NORCO   Take 1-2 tablets by mouth every 4 hours as needed for other (Moderate to Severe Pain)   Last time this was given:  1 tablet on 7/14/2017 12:38 PM   Notes to Patient:  You were given one tablet at 12:40 p.m.                                METFORMIN HCL PO   Take 1,000 mg by mouth 2 times  daily (with meals)                                simvastatin 40 MG tablet   Commonly known as:  ZOCOR   Take 1 tablet by mouth At Bedtime.

## 2017-07-14 NOTE — ANESTHESIA PREPROCEDURE EVALUATION
Anesthesia Evaluation     . Pt has had prior anesthetic. Type: General           ROS/MED HX    ENT/Pulmonary:       Neurologic:       Cardiovascular:         METS/Exercise Tolerance:     Hematologic:         Musculoskeletal:         GI/Hepatic:         Renal/Genitourinary:         Endo:     (+) type II DM thyroid problem  Thyroid disease - Other hyperparathyroidism, .      Psychiatric:     (+) psychiatric history depression      Infectious Disease:         Malignancy:   (+) Malignancy History of Prostate          Other:                                    Anesthesia Plan      History & Physical Review  History and physical reviewed and following examination; no interval change.    ASA Status:  2 .        Plan for General with Intravenous induction. Maintenance will be TIVA.    PONV prophylaxis:  Ondansetron (or other 5HT-3) and Dexamethasone or Solumedrol  Propofol, Zofran, and decadron      Postoperative Care  Postoperative pain management:  IV analgesics and Oral pain medications.      Consents  Anesthetic plan, risks, benefits and alternatives discussed with:  Patient..                          .

## 2017-07-14 NOTE — IP AVS SNAPSHOT
[facility]    [address]    [place] [address]    Phone:  [phone]    Fax:  [phone]                                       After Visit Summary   7/14/2017    Duncan Chappell    MRN: [id number]           After Visit Summary Signature Page     I have received my discharge instructions, and my questions have been answered. I have discussed any challenges I see with this plan with the nurse or doctor.    ..........................................................................................................................................  Patient/Patient Representative Signature      ..........................................................................................................................................  Patient Representative Print Name and Relationship to Patient    ..................................................               ................................................  Date                                            Time    ..........................................................................................................................................  Reviewed by Signature/Title    ...................................................              ..............................................  Date                                                            Time

## 2017-07-17 ENCOUNTER — TELEPHONE (OUTPATIENT)
Dept: SURGERY | Facility: CLINIC | Age: 67
End: 2017-07-17

## 2017-07-17 LAB — COPATH REPORT: NORMAL

## 2017-07-17 NOTE — TELEPHONE ENCOUNTER
"Post Surgical Follow Up Call -     \"Hi, my name is Tisha Anderson, I'm a registered nurse, and I am calling from [facility] to follow up and see how things are going for you after your recent surgery.\"    Tell me how you are doing now that you are home?\" Patient states that he is doing well since surgery, no concerns at this time      Discharge Instructions    \"Do you have any questions regarding your discharge instructions?\"  No    If applicable:  \"Are you currently taking your post op medication?\"  No     If yes, review dosing schedule with patient.  NA    If applicable:  Discuss any pathology results within normal limits.  NA    Check EPIC schedule to see if patient has Post Op visit already scheduled. Patient was scheduled for post op appt. On 7/27/17 at 1:00pm       Call Summary      \"If you have questions, we encourage you contact us through the main clinic number (give number).\"      \"Thank you for your time and take care!\"    "

## 2017-07-27 ENCOUNTER — OFFICE VISIT (OUTPATIENT)
Dept: SURGERY | Facility: CLINIC | Age: 67
End: 2017-07-27
Payer: COMMERCIAL

## 2017-07-27 ENCOUNTER — HOSPITAL ENCOUNTER (OUTPATIENT)
Dept: LAB | Facility: CLINIC | Age: 67
Discharge: HOME | End: 2017-07-27
Attending: SURGERY | Admitting: SURGERY
Payer: COMMERCIAL

## 2017-07-27 DIAGNOSIS — Z09 SURGERY FOLLOW-UP EXAMINATION: ICD-10-CM

## 2017-07-27 DIAGNOSIS — Z09 SURGERY FOLLOW-UP EXAMINATION: Primary | ICD-10-CM

## 2017-07-27 LAB — CALCIUM SERPL-MCNC: 9 MG/DL (ref 8.5–10.1)

## 2017-07-27 PROCEDURE — 36415 COLL VENOUS BLD VENIPUNCTURE: CPT | Performed by: SURGERY

## 2017-07-27 PROCEDURE — 82310 ASSAY OF CALCIUM: CPT | Performed by: SURGERY

## 2017-07-27 PROCEDURE — 82542 COL CHROMOTOGRAPHY QUAL/QUAN: CPT | Mod: 90 | Performed by: SURGERY

## 2017-07-27 PROCEDURE — 99000 SPECIMEN HANDLING OFFICE-LAB: CPT | Performed by: SURGERY

## 2017-07-27 PROCEDURE — 99024 POSTOP FOLLOW-UP VISIT: CPT | Performed by: SURGERY

## 2017-07-27 NOTE — PROGRESS NOTES
Surgery Postop Note    Duncan Chappell presents today for surgical followup.  he is doing well following neck exploration parathyroid adenoma excision.  Incisions look fine with no signs of wound infection.  We removed a benign 850 mg parathyroid gland.  PTH dropped from greater than 400 preoperatively to 48 after removal of the gland.  He feels well.  Voice is normal.  I expect him to make a complete recovery.  Thank you for the opportunity to help in his care.    Jostin Chapman M.D.  [facility]  [phone]    Please route or send letter to:  Primary Care Provider (PCP) and Referring Provider

## 2017-07-27 NOTE — MR AVS SNAPSHOT
"              After Visit Summary   7/27/2017    Duncan Chappell    MRN: [id number]           Patient Information     Date Of Birth          1950        Visit Information        Provider Department      7/27/2017 1:00 PM Estrada Chapman MD [facility]      Today's Diagnoses     Surgery follow-up examination    -  1       Follow-ups after your visit        Future tests that were ordered for you today     Open Future Orders        Priority Expected Expires Ordered    Calcium Routine 7/27/2017 7/27/2018 7/27/2017            Who to contact     If you have questions or need follow up information about today's clinic visit or your schedule please contact [facility] directly at [phone].  Normal or non-critical lab and imaging results will be communicated to you by [employer], letter or phone within 4 business days after the clinic has received the results. If you do not hear from us within 7 days, please contact the clinic through [employer] or phone. If you have a critical or abnormal lab result, we will notify you by phone as soon as possible.  Submit refill requests through [employer] or call your pharmacy and they will forward the refill request to us. Please allow 3 business days for your refill to be completed.          Additional Information About Your Visit        [employer] Information     [employer] lets you send messages to your doctor, view your test results, renew your prescriptions, schedule appointments and more. To sign up, go to [email or web address] . Click on \"Log in\" on the left side of the screen, which will take you to the Welcome page. Then click on \"Sign up Now\" on the right side of the page.     You will be asked to enter the access code listed below, as well as some personal information. Please follow the directions to create your username and password.     Your access code is: [email or web address]  Expires: " 2017  3:55 PM     Your access code will  in 90 days. If you need help or a new code, please call your [place] clinic or [phone].        Care EveryWhere ID     This is your Care EveryWhere ID. This could be used by other organizations to access your [place] medical records  [id number]         Blood Pressure from Last 3 Encounters:   17 (!) 135/8   17 132/77    Weight from Last 3 Encounters:   17 210 lb (95.3 kg)   17 210 lb (95.3 kg)              We Performed the Following     PTH Related Peptide Test        Primary Care Provider Office Phone # Fax #    Dirk Merida [phone]       [place] [address]        Equal Access to Services     [facility] : Hadii aad ku hadasho Soalaina, waaxda luqadaha, qaybta kaalmada suzanne, dawna anne . So [facility] [phone].    ATENCIÓN: Si habla español, tiene a levy disposición servicios gratuitos de asistencia lingüística. Michael al [phone].    We comply with applicable federal civil rights laws and [place] laws. We do not discriminate on the basis of race, color, national origin, age, disability sex, sexual orientation or gender identity.            Thank you!     Thank you for choosing [facility]  for your care. Our goal is always to provide you with excellent care. Hearing back from our patients is one way we can continue to improve our services. Please take a few minutes to complete the written survey that you may receive in the mail after your visit with us. Thank you!             Your Updated Medication List - Protect others around you: Learn how to safely use, store and throw away your medicines at www.disposemymeds.org.          This list is accurate as of: 17  1:19 PM.  Always use your most recent med list.                   Brand Name Dispense Instructions for use Diagnosis    ESCITALOPRAM OXALATE PO            METFORMIN HCL PO      Take 1,000 mg by mouth 2 times daily (with meals)        simvastatin 40 MG tablet    ZOCOR    90 tablet    Take 1 tablet by mouth At Bedtime.    Hypercholesterolemia

## 2017-07-27 NOTE — LETTER
2017    Surgery Postop Note    RE:  Duncan Chappell-:  52     Duncan Chappell presents today for surgical followup.  He is doing well following neck exploration parathyroid adenoma excision.  Incisions look fine with no signs of wound infection.  We removed a benign 850 mg parathyroid gland.  PTH dropped from greater than 400 preoperatively to 48 after removal of the gland.  He feels well.  Voice is normal.  I expect him to make a complete recovery.  Thank you for the opportunity to help in his care.     Jostin Chapman M.D.  [facility]  [phone]

## 2017-07-31 LAB — PTH RELATED PROT SERPL-SCNC: NORMAL PMOL/L

## (undated) DEVICE — NDL 22GA 1.5"

## (undated) DEVICE — SU VICRYL 4-0 TIE 12X18" DYED J103T

## (undated) DEVICE — SYR 03ML LL W/O NDL 309657

## (undated) DEVICE — PACK UNIVERSAL SPLIT 29131

## (undated) DEVICE — ESU HANDPIECE THUNDERBEAT ENDOSCOPIC FINE JAW TB-00090F

## (undated) DEVICE — PACK MINOR SBA15MIFSE

## (undated) DEVICE — SU VICRYL 3-0 SH 27" UND J416H

## (undated) DEVICE — DRSG STERI STRIP 1/4X3" R1541

## (undated) DEVICE — SU VICRYL 2-0 TIE 12X18" J905T

## (undated) DEVICE — SLEEVE PROTECTIVE BREAST BIOPSY  GMSLV001-10

## (undated) DEVICE — ESU HOLSTER PLASTIC DISP E2400

## (undated) DEVICE — DRSG GAUZE 4X4" 3033

## (undated) DEVICE — SUCTION CANISTER MEDIVAC LINER 3000ML W/LID 65651-530

## (undated) DEVICE — GLOVE PROTEXIS W/NEU-THERA 7.5  2D73TE75

## (undated) DEVICE — SU MONOCRYL 4-0 P-3 18" UND Y494G

## (undated) DEVICE — CATH TRAY FOLEY SURESTEP 16FR WDRAIN BAG STLK LATEX A300316A

## (undated) DEVICE — SOL NACL 0.9% IRRIG 1000ML BOTTLE 07138-09

## (undated) DEVICE — BLADE KNIFE SURG 15 371115

## (undated) DEVICE — GLOVE PROTEXIS BLUE W/NEU-THERA 7.5  2D73EB75

## (undated) DEVICE — PREP CHLORAPREP W/ORANGE TINT 10.5ML 260715

## (undated) DEVICE — ESU ELEC BLADE 2.75" COATED/INSULATED E1455

## (undated) DEVICE — SYR EAR BULB 3OZ 0035830

## (undated) DEVICE — DRSG TELFA 2X3"

## (undated) DEVICE — LINEN TOWEL PACK X5 5464

## (undated) DEVICE — PROBE NEUROSIGN MAGSTIM BIPOLAR 3601-00-TE

## (undated) RX ORDER — PROPOFOL 10 MG/ML
INJECTION, EMULSION INTRAVENOUS
Status: DISPENSED
Start: 2017-07-14

## (undated) RX ORDER — FENTANYL CITRATE 50 UG/ML
INJECTION, SOLUTION INTRAMUSCULAR; INTRAVENOUS
Status: DISPENSED
Start: 2017-07-14

## (undated) RX ORDER — HYDROCODONE BITARTRATE AND ACETAMINOPHEN 5; 325 MG/1; MG/1
TABLET ORAL
Status: DISPENSED
Start: 2017-07-14

## (undated) RX ORDER — DEXAMETHASONE SODIUM PHOSPHATE 4 MG/ML
INJECTION, SOLUTION INTRA-ARTICULAR; INTRALESIONAL; INTRAMUSCULAR; INTRAVENOUS; SOFT TISSUE
Status: DISPENSED
Start: 2017-07-14

## (undated) RX ORDER — CEFAZOLIN SODIUM 2 G/100ML
INJECTION, SOLUTION INTRAVENOUS
Status: DISPENSED
Start: 2017-07-14

## (undated) RX ORDER — LIDOCAINE HYDROCHLORIDE 20 MG/ML
INJECTION, SOLUTION EPIDURAL; INFILTRATION; INTRACAUDAL; PERINEURAL
Status: DISPENSED
Start: 2017-07-14

## (undated) RX ORDER — GLYCOPYRROLATE 0.2 MG/ML
INJECTION, SOLUTION INTRAMUSCULAR; INTRAVENOUS
Status: DISPENSED
Start: 2017-07-14